# Patient Record
Sex: FEMALE | Race: WHITE
[De-identification: names, ages, dates, MRNs, and addresses within clinical notes are randomized per-mention and may not be internally consistent; named-entity substitution may affect disease eponyms.]

---

## 2018-08-16 ENCOUNTER — HOSPITAL ENCOUNTER (INPATIENT)
Dept: HOSPITAL 68 - ERH | Age: 63
LOS: 6 days | DRG: 175 | End: 2018-08-22
Attending: INTERNAL MEDICINE | Admitting: INTERNAL MEDICINE
Payer: COMMERCIAL

## 2018-08-16 VITALS — WEIGHT: 274 LBS | BODY MASS INDEX: 43 KG/M2 | HEIGHT: 67 IN

## 2018-08-16 DIAGNOSIS — D68.59: ICD-10-CM

## 2018-08-16 DIAGNOSIS — M54.9: ICD-10-CM

## 2018-08-16 DIAGNOSIS — Z91.81: ICD-10-CM

## 2018-08-16 DIAGNOSIS — I82.4Z2: ICD-10-CM

## 2018-08-16 DIAGNOSIS — Z90.01: ICD-10-CM

## 2018-08-16 DIAGNOSIS — I87.2: ICD-10-CM

## 2018-08-16 DIAGNOSIS — I47.1: ICD-10-CM

## 2018-08-16 DIAGNOSIS — J96.01: ICD-10-CM

## 2018-08-16 DIAGNOSIS — Z90.49: ICD-10-CM

## 2018-08-16 DIAGNOSIS — I44.4: ICD-10-CM

## 2018-08-16 DIAGNOSIS — Z79.51: ICD-10-CM

## 2018-08-16 DIAGNOSIS — E78.5: ICD-10-CM

## 2018-08-16 DIAGNOSIS — F41.9: ICD-10-CM

## 2018-08-16 DIAGNOSIS — I45.10: ICD-10-CM

## 2018-08-16 DIAGNOSIS — I26.99: Primary | ICD-10-CM

## 2018-08-16 DIAGNOSIS — E11.42: ICD-10-CM

## 2018-08-16 DIAGNOSIS — Z79.01: ICD-10-CM

## 2018-08-16 DIAGNOSIS — F32.9: ICD-10-CM

## 2018-08-16 DIAGNOSIS — D68.62: ICD-10-CM

## 2018-08-16 DIAGNOSIS — E66.9: ICD-10-CM

## 2018-08-16 DIAGNOSIS — Z79.84: ICD-10-CM

## 2018-08-16 DIAGNOSIS — R32: ICD-10-CM

## 2018-08-16 DIAGNOSIS — Z86.73: ICD-10-CM

## 2018-08-16 LAB
ABSOLUTE GRANULOCYTE CT: 6.2 /CUMM (ref 1.4–6.5)
APTT BLD: 31 SEC (ref 25–37)
BASOPHILS # BLD: 0 /CUMM (ref 0–0.2)
BASOPHILS NFR BLD: 0.1 % (ref 0–2)
EOSINOPHIL # BLD: 0.1 /CUMM (ref 0–0.7)
EOSINOPHIL NFR BLD: 1.3 % (ref 0–5)
ERYTHROCYTE [DISTWIDTH] IN BLOOD BY AUTOMATED COUNT: 15.2 % (ref 11.5–14.5)
GRANULOCYTES NFR BLD: 80 % (ref 42.2–75.2)
HCT VFR BLD CALC: 35.6 % (ref 37–47)
LYMPHOCYTES # BLD: 0.9 /CUMM (ref 1.2–3.4)
MCH RBC QN AUTO: 26 PG (ref 27–31)
MCHC RBC AUTO-ENTMCNC: 33.2 G/DL (ref 33–37)
MCV RBC AUTO: 78.3 FL (ref 81–99)
MONOCYTES # BLD: 0.5 /CUMM (ref 0.1–0.6)
PLATELET # BLD: 150 /CUMM (ref 130–400)
PMV BLD AUTO: 10 FL (ref 7.4–10.4)
PROTHROMBIN TIME: 13.9 SEC (ref 9.4–12.5)
RED BLOOD CELL CT: 4.54 /CUMM (ref 4.2–5.4)
WBC # BLD AUTO: 7.8 /CUMM (ref 4.8–10.8)

## 2018-08-16 PROCEDURE — 85613 RUSSELL VIPER VENOM DILUTED: CPT

## 2018-08-16 PROCEDURE — 85730 THROMBOPLASTIN TIME PARTIAL: CPT
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
## 2018-08-16 NOTE — CT SCAN REPORT
EXAMINATION:
CT ANGIOGRAM OF THE CHEST WITH AND WITHOUT CONTRAST (CT PULMONARY ANGIOGRAM
FOR PE)
 
CLINICAL INFORMATION:
Shortness of breath. Hypoxia.
 
COMPARISON:
Portable chest 08/16/2018. CT of chest 04/11/2009.
 
TECHNIQUE:
Prior to contrast administration, noncontrast localization images were
obtained. Subsequently, multidetector volumetric imaging was performed from
the thoracic inlet to below the diaphragms following the administration of 95
mL Optiray 320 intravenous contrast.
No contrast reaction reported.
Sagittal, coronal, and MIP oblique sagittal reformatted images were obtained
on the CT workstation, uploaded to PACS, and reviewed.
Total exam dose-length product 552.73 mGy-cm.
 
FINDINGS:
 
QUALITY OF STUDY/CONTRAST BOLUS: Satisfactory
 
PULMONARY ARTERIES: There are pulmonary emboli. There are multiple emboli in
the distal right main pulmonary artery that extend into the secondary and
tertiary branches of the upper lobe, lower lobe and middle lobe. No emboli
seen in the left lung.
 
THORACIC AORTA: No aneurysm or dissection.
 
LUNG: There is focal dense consolidation with air bronchograms involving the
right lower lobe. This is a large infiltrate involving the majority of the
right lower lobe. There is a small extension now into the inferior right
middle lobe.
 
PLEURA: No pleural effusion or pneumothorax.
 
MEDIASTINUM: There is a enlarged lymph node at the srinivas in the pretracheal
retrovascular space and smaller lymph nodes in the same space and the AP
window. There is an enlarged right paratracheal lymph node. These are likely
reactive.
 
No evidence of septal bowing or right heart strain.
 
CHEST WALL/AXILLA: No axillary or internal mammary lymphadenopathy.
 
OSSEOUS STRUCTURES: Multilevel degenerative spondylosis spine with endplate
spurring of the vertebrae.
 
UPPER ABDOMEN: Unremarkable. No reflux of contrast into the hepatic veins to
suggest elevated right heart pressures.
Status post cholecystectomy.
 
IMPRESSION:
1. There are multiple emboli in the right lung.
 
2. Lumbar pneumonia of the right lower lobe. There is mediastinal
lymphadenopathy which is likely reactive.
 
This critical result was discussed with Dr. Mack on 08/16/2018, 11:00 PM
and it was ascertained that the content and urgency of the report was
understood at the time of direct communication.
 
VTE: positive

## 2018-08-16 NOTE — CT SCAN REPORT
EXAMINATION:
CT HEAD WITHOUT CONTRAST
 
CLINICAL INFORMATION:
Question TIA.
 
COMPARISON:
Brain MRI 11/29/2017.
 
TECHNIQUE:
Contiguous axial imaging was performed from the skull base to vertex without
intravenous administration of contrast.
 
DLP:
635 mGy-cm.
 
FINDINGS:
There is no intracranial hemorrhage, large infarction, or mass lesion. There
is no extra-axial collection. There is redemonstration of old left caudate
lacunar infarct. There is mild scattered hypoattenuation in the bilateral
cerebral white matter, which is nonspecific but likely reflects small vessel
disease. There is no evidence of hydrocephalus.
 
The visualized paranasal sinuses and mastoid air cells are clear. A right
ocular prosthesis is noted. There is asymmetric prominence of the right
superior ophthalmic vein but similar to prior.
 
IMPRESSION:
- No acute intracranial abnormality.
- Chronic infarct in the left caudate.
- Mild small vessel ischemic changes in the cerebral white matter.

## 2018-08-16 NOTE — RADIOLOGY REPORT
EXAMINATION:
CHEST 1 VIEW
 
CLINICAL INFORMATION:
Shortness of breath. Hypoxia.
 
COMPARISON:
08/29/2014.
 
TECHNIQUE:
An AP view of the chest is provided.
 
FINDINGS:
The cardiac silhouette is not enlarged. The mediastinal and hilar contours
are unremarkable. There are neither pleural effusions nor pneumothoraces.
There is right lower lobe opacification. The osseous structures are
unremarkable.
 
IMPRESSION:
 
Right lower lobe opacification concerning for pneumonia.
 
Recommendation is for a followup chest series to be obtained following
treatment and/or resolution of symptoms to assure resolution of this
appearance.
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
## 2018-08-17 VITALS — SYSTOLIC BLOOD PRESSURE: 102 MMHG | DIASTOLIC BLOOD PRESSURE: 60 MMHG

## 2018-08-17 VITALS — DIASTOLIC BLOOD PRESSURE: 60 MMHG | SYSTOLIC BLOOD PRESSURE: 116 MMHG

## 2018-08-17 LAB
ABSOLUTE GRANULOCYTE CT: 3.2 /CUMM (ref 1.4–6.5)
APTT BLD: 57 SEC (ref 25–37)
APTT BLD: 62 SEC (ref 25–37)
BASOPHILS # BLD: 0.1 /CUMM (ref 0–0.2)
BASOPHILS NFR BLD: 0.9 % (ref 0–2)
EOSINOPHIL # BLD: 0.2 /CUMM (ref 0–0.7)
EOSINOPHIL NFR BLD: 4.4 % (ref 0–5)
ERYTHROCYTE [DISTWIDTH] IN BLOOD BY AUTOMATED COUNT: 15.4 % (ref 11.5–14.5)
GRANULOCYTES NFR BLD: 56 % (ref 42.2–75.2)
HCT VFR BLD CALC: 33 % (ref 37–47)
LYMPHOCYTES # BLD: 1.7 /CUMM (ref 1.2–3.4)
MCH RBC QN AUTO: 26.1 PG (ref 27–31)
MCHC RBC AUTO-ENTMCNC: 33 G/DL (ref 33–37)
MCV RBC AUTO: 79.1 FL (ref 81–99)
MONOCYTES # BLD: 0.5 /CUMM (ref 0.1–0.6)
PLATELET # BLD: 140 /CUMM (ref 130–400)
PMV BLD AUTO: 10 FL (ref 7.4–10.4)
RED BLOOD CELL CT: 4.16 /CUMM (ref 4.2–5.4)
WBC # BLD AUTO: 5.7 /CUMM (ref 4.8–10.8)

## 2018-08-17 NOTE — ECHOCARDIOGRAM REPORT
TONY SOTO 
 
 Age:    63     :    1955      Gender:     F 
 
 MRN:    [id number] 
 
 Exam Date:     2018  
                08:00 
 
 Exam Location:  
 [facility] 
 
 Ht (in):     68      Wt (lb):      260     BSA:    2.43 
 
 BP:          136     /     80 
 
 Ordering Physician:        Awadalla, Amira, MD 
 
 Referring Physician:       Awadalla, Amira, MD 
 
 Technologist:              Ok Purcell [facility] 
 
 Room Number:               107-1 
 
 Indications:       Other pulmonary embolism without acute cor pulmonale 
 
 Rhythm:                 Sinus 
 
 Technical Quality:      poor 
 
 
 FINDINGS 
 Left Ventricle 
 Normal left ventricular size with mild left ventricular hypertrophy.   
 Normal systolic function with no obvious regional wall motion  
 abnormalities.    The ejection fraction is visually estimated at  
 60%. 
 
 Right Ventricle 
 The right ventricle is not measured. Mildly decreased contractility 
 
 Right Atrium 
 The right atrium is normal in size. 
 
 Left Atrium 
 The left atrium is normal in size.  The interatrial septum is  
 intact. 
 
 Mitral Valve 
 The mitral valve is normal in structure and function.  There is no  
 mitral regurgitation. 
 
 Aortic Valve 
 Mildly thickened and sclerotic aortic valve with mild stenosis.   
 There is no aortic regurgitation. 
 
 Tricuspid Valve 
 The tricuspid valve is normal in structure and function.  There is  
 trace tricuspid regurgitation.  Pulmonary artery systolic pressure  
 is normal based on suboptimal measurements. 
 
 Pulmonic Valve 
 Structurally normal pulmonic valve.  There is no pulmonic  
 regurgitation. 
 
 Pericardium 
 Normal pericardium without effusion.  No pleural effusion. 
 
 Great Vessels 
 Normal aortic root dimension.  The aortic arch and great vessels are  
 well seen and are normal. 
 
 CONCLUSIONS 
 1. Normal EF of 60%. 
 2. Mild left ventricular hypertrophy. 
 3. Mildly decreaed RV function. 
 4. Trace tricuspid regurgitation. 
 5. Normal RV pressures based on suboptimal measurements. 
 
 Mustapha Orta M.D. 
 (Electronically Signed) 
 Final Date:      2018  
                  21:37 
 
 MEASUREMENTS  (Male / Female) Normal Values 
 
 2D ECHO 
 LV Diastolic Diameter PLAX                          5.0 cm           4.2 - 5.9 
/ 3.9 - 5.3 cm 
 LV Systolic Diameter PLAX                           3.6 cm           2.1 - 4.0 
cm 
 LV Fractional Shortening PLAX                       28.0 %           25 - 46  %
 
 LV Ejection Fraction 2D Teich                       54.0 %            
 IVS Diastolic Thickness                             1.5 cm            
 LVPW Diastolic Thickness                            1.3 cm            
 LV Relative Wall Thickness                          0.6               
 LVOT Diameter                                       2.1 cm            
 Aortic Root Diameter                                2.9 cm            
 LA Systolic Diameter LX                             3.9 cm           3.0 - 4.0 
/ 2.7 - 3.8 cm 
 LV Diastolic Length 4C                              7.8 cm           6.9 - 10.3
cm 
 LV Diastolic Area 4C                                23.6 cm          
 LV Diastolic Volume MOD 4C                          61.0 cm          
 LV Ejection Fraction MOD 4C                         67.2 %            
 LV Stroke Volume MOD 4C                             41.0 cm          
 LV Cardiac Index MOD 4C                             1416.1 cm/min   
 LV Systolic Length 4C                               6.0 cm            
 LV Systolic Area 4C                                 12.1 cm          
 LV Systolic Volume MOD 4C                           20.0 cm          
 LV Ejection Fraction MOD 2C                         70.5 %            
 LV Cardiac Index MOD 2C                             2141.4 cm/min   
 LV Diastolic Volume 4C AL                           60.5 cm         85 - 139 /
69 - 109 cm 
 LV Systolic Volume 4C AL                            20.8 cm          
 LV Ejection Fraction 4C AL                          65.6 %            
 LV Stroke Volume 4C AL                              39.7 cm          
 LV Cardiac Index 4C AL                              1370.7 cm/min   
 LV Ejection Fraction 2C AL                          71.6 %            
 LV Cardiac Index 2C AL                              2209.0 cm/min   
 Ascending Aorta Diameter                            3.7 cm            
 
 DOPPLER 
 AV Peak Velocity                                    229.0 cm/s        
 AV Peak Gradient                                    21.0 mmHg         
 LVOT Peak Velocity                                  111.0 cm/s        
 LVOT Peak Gradient                                  4.9 mmHg          
 AV Area Cont Eq pk                                  1.7 cm           
 Mitral E Point Velocity                             86.9 cm/s         
 Mitral A Point Velocity                             71.6 cm/s         
 Mitral E to A Ratio                                 1.2               
 MV Deceleration Time                                299.0 ms          
 TR Peak Velocity                                    255.0 cm/s        
 TR Peak Gradient                                    26.0 mmHg         
 PV Peak Velocity                                    126.0 cm/s        
 PV Peak Gradient                                    6.4 mmHg          
 LV E' Lateral Velocity                              10.4 cm/s         
 Mitral E to LV E' Lateral Ratio                     8.4               
 LV E' Septal Velocity                               8.1 cm/s          
 Mitral E to LV E' Septal Ratio                      10.7
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
## 2018-08-17 NOTE — ULTRASOUND REPORT
EXAMINATION:
BILATERAL LOWER EXTREMITY VENOUS ULTRASOUND
 
CLINICAL INFORMATION:
Rule out DVT in setting of PE.
 
COMPARISON:
CT pulmonary angiogram dated 8/16/2018. Left lower extremity venous Doppler
ultrasound dated 9/29/2017 and 12/9/2015. Right lower extremity venous
Doppler ultrasound dated 1/14/2016.
 
TECHNIQUE:
Doppler spectral analysis and color flow Doppler imaging was performed of the
lower extremities. Compression and augmentation maneuvers were performed.
 
FINDINGS:
RIGHT LOWER EXTREMITY VENOUS DOPPLER ULTRASOUND:
The right common femoral vein, greater saphenous vein takeoff, femoral vein,
and popliteal vein are normally compressible with normal augmentation
responses and phasic changes seen with Doppler imaging. The midcalf peroneal
and posterior tibial veins are patent as well.
 
No popliteal cyst is seen.
 
LEFT LOWER EXTREMITY VENOUS DOPPLER ULTRASOUND:
The right common femoral vein, greater saphenous vein takeoff, femoral vein
and proximal popliteal vein are normally compressible with normal phasic
changes and augmentation responses seen. In the mid and distal popliteal
vein, abnormal eccentric echogenic thrombus is seen with noncompressibility
of the popliteal vein and no flow seen in the thrombosed segment extending
into the popliteal trunk. Findings are consistent with a partial nonocclusive
thrombus. (Of note, on a 12/9/2015 ultrasound, more extensive left popliteal
vein thrombus was seen extending into the lower femoral vein. This was found
to have resolved on an intervening ultrasound dated 11/29/2017.) There is
also noncompressibility and lack of vascular flow seen within the left
gastrocnemius veins. The left peroneal and posterior tibial veins are patent.
 
No popliteal cyst is seen.
 
IMPRESSION:
1. Recurrent nonocclusive thrombus is seen within the left popliteal vein
extending into the popliteal trunk. This is less extensive than on prior exam
from 12/9/2015 and new when compared to 11/29/2017.
2. Occlusive thrombus is seen in the left gastrocnemius veins.
3. No evidence of deep venous thrombosis in the right lower extremity.
 
This critical result was discussed with Dr. Fransisca Mcdaniels 8/17/2018, 12:29 PM
and it was ascertained that the content and urgency of this report was
understood at the time of direct communication.
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
## 2018-08-18 VITALS — DIASTOLIC BLOOD PRESSURE: 60 MMHG | SYSTOLIC BLOOD PRESSURE: 110 MMHG

## 2018-08-18 VITALS — DIASTOLIC BLOOD PRESSURE: 60 MMHG | SYSTOLIC BLOOD PRESSURE: 108 MMHG

## 2018-08-18 VITALS — SYSTOLIC BLOOD PRESSURE: 124 MMHG | DIASTOLIC BLOOD PRESSURE: 70 MMHG

## 2018-08-18 VITALS — SYSTOLIC BLOOD PRESSURE: 130 MMHG | DIASTOLIC BLOOD PRESSURE: 74 MMHG

## 2018-08-18 LAB
ABSOLUTE GRANULOCYTE CT: 2.3 /CUMM (ref 1.4–6.5)
APTT BLD: 71 SEC (ref 25–37)
BASOPHILS # BLD: 0 /CUMM (ref 0–0.2)
BASOPHILS NFR BLD: 0.6 % (ref 0–2)
EOSINOPHIL # BLD: 0.4 /CUMM (ref 0–0.7)
EOSINOPHIL NFR BLD: 7.3 % (ref 0–5)
ERYTHROCYTE [DISTWIDTH] IN BLOOD BY AUTOMATED COUNT: 15.1 % (ref 11.5–14.5)
GRANULOCYTES NFR BLD: 47.4 % (ref 42.2–75.2)
HCT VFR BLD CALC: 31.8 % (ref 37–47)
LYMPHOCYTES # BLD: 1.8 /CUMM (ref 1.2–3.4)
MCH RBC QN AUTO: 26.1 PG (ref 27–31)
MCHC RBC AUTO-ENTMCNC: 33.1 G/DL (ref 33–37)
MCV RBC AUTO: 78.9 FL (ref 81–99)
MONOCYTES # BLD: 0.4 /CUMM (ref 0.1–0.6)
PLATELET # BLD: 144 /CUMM (ref 130–400)
PMV BLD AUTO: 10.6 FL (ref 7.4–10.4)
RED BLOOD CELL CT: 4.03 /CUMM (ref 4.2–5.4)
WBC # BLD AUTO: 4.9 /CUMM (ref 4.8–10.8)
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
## 2018-08-19 VITALS — DIASTOLIC BLOOD PRESSURE: 98 MMHG | SYSTOLIC BLOOD PRESSURE: 148 MMHG

## 2018-08-19 VITALS — DIASTOLIC BLOOD PRESSURE: 72 MMHG | SYSTOLIC BLOOD PRESSURE: 128 MMHG

## 2018-08-19 VITALS — SYSTOLIC BLOOD PRESSURE: 132 MMHG | DIASTOLIC BLOOD PRESSURE: 80 MMHG

## 2018-08-19 LAB
ABSOLUTE GRANULOCYTE CT: 3 /CUMM (ref 1.4–6.5)
BASOPHILS # BLD: 0 /CUMM (ref 0–0.2)
BASOPHILS NFR BLD: 0.8 % (ref 0–2)
EOSINOPHIL # BLD: 0.3 /CUMM (ref 0–0.7)
EOSINOPHIL NFR BLD: 4.8 % (ref 0–5)
ERYTHROCYTE [DISTWIDTH] IN BLOOD BY AUTOMATED COUNT: 15.3 % (ref 11.5–14.5)
GRANULOCYTES NFR BLD: 57 % (ref 42.2–75.2)
HCT VFR BLD CALC: 34.1 % (ref 37–47)
LYMPHOCYTES # BLD: 1.6 /CUMM (ref 1.2–3.4)
MCH RBC QN AUTO: 26.2 PG (ref 27–31)
MCHC RBC AUTO-ENTMCNC: 33.3 G/DL (ref 33–37)
MCV RBC AUTO: 78.6 FL (ref 81–99)
MONOCYTES # BLD: 0.3 /CUMM (ref 0.1–0.6)
PLATELET # BLD: 182 /CUMM (ref 130–400)
PMV BLD AUTO: 10.4 FL (ref 7.4–10.4)
RED BLOOD CELL CT: 4.33 /CUMM (ref 4.2–5.4)
WBC # BLD AUTO: 5.3 /CUMM (ref 4.8–10.8)
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
## 2018-08-20 VITALS — SYSTOLIC BLOOD PRESSURE: 142 MMHG | DIASTOLIC BLOOD PRESSURE: 96 MMHG

## 2018-08-20 VITALS — SYSTOLIC BLOOD PRESSURE: 154 MMHG | DIASTOLIC BLOOD PRESSURE: 70 MMHG

## 2018-08-20 VITALS — SYSTOLIC BLOOD PRESSURE: 164 MMHG | DIASTOLIC BLOOD PRESSURE: 88 MMHG

## 2018-08-20 LAB
ABSOLUTE GRANULOCYTE CT: 4.8 /CUMM (ref 1.4–6.5)
BASOPHILS # BLD: 0 /CUMM (ref 0–0.2)
BASOPHILS NFR BLD: 0.4 % (ref 0–2)
EOSINOPHIL # BLD: 0.2 /CUMM (ref 0–0.7)
EOSINOPHIL NFR BLD: 2.7 % (ref 0–5)
ERYTHROCYTE [DISTWIDTH] IN BLOOD BY AUTOMATED COUNT: 15.2 % (ref 11.5–14.5)
GRANULOCYTES NFR BLD: 68.1 % (ref 42.2–75.2)
HCT VFR BLD CALC: 35.7 % (ref 37–47)
LYMPHOCYTES # BLD: 1.7 /CUMM (ref 1.2–3.4)
MCH RBC QN AUTO: 25.7 PG (ref 27–31)
MCHC RBC AUTO-ENTMCNC: 32.8 G/DL (ref 33–37)
MCV RBC AUTO: 78.4 FL (ref 81–99)
MONOCYTES # BLD: 0.3 /CUMM (ref 0.1–0.6)
PLATELET # BLD: 226 /CUMM (ref 130–400)
PMV BLD AUTO: 10.4 FL (ref 7.4–10.4)
RED BLOOD CELL CT: 4.55 /CUMM (ref 4.2–5.4)
WBC # BLD AUTO: 7.1 /CUMM (ref 4.8–10.8)
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
## 2018-08-20 NOTE — PATIENT DISCHARGE INSTRUCTIONS
Discharge Instructions
 
General Discharge Information
You were seen/treated for:
Multiple Pulmonary Emboli
LLE DVT
Watch for these problems:
Please return to the ER in case of chest pain, worsening shortness of breath, 
worsening leg pain or swelling ot any syncopal episodes. 
Special Instructions:
Please follow up with your PCP within a week after discharge. 
 
Please follow up with the hematologist within a week after discharge. 
 
Diet
Continue normal diet: Yes
 
Activity
Full Activity/No Limits: Yes
 
Acute Coronary Syndrome
 
Inclusion Criteria
At DC or during hospital stay patient has or had the following:
ACS DIAGNOSIS No
 
Discharge Core Measures
Meds if any: Prescribed or Continued at Discharge
Meds if any: NOT Prescribed or Continued at Discharge
 
Congestive Heart Failure
 
Inclusion Criteria
At DC or during hospital stay patient has or had the following:
CHF DIAGNOSIS No
 
Discharge Core Measures
Meds if any: Prescribed or Continued at Discharge
Meds if any: NOT Prescribed or Continued at Discharge
 
Cerebrovascular accident
 
Inclusion Criteria
At DC or during hospital stay patient has or had the following:
CVA/TIA Diagnosis No
 
Discharge Core Measures
Meds if any: Prescribed or Continued at Discharge
Meds if any: NOT Prescribed or Continued at Discharge
 
Venous thromboembolism
 
Inclusion Criteria
VTE Diagnosis Yes
VTE Type Pulmonary Embolism
VTE Confirmed by (Test) CT CHEST ANGIOGRAM
 
Discharge Core Measures
- Per Current guidelines, there needs to be overlap
- treatment for the first 5 days of Warfarin therapy.
- If discharged on Warfarin prior to 5 days of
- overlap therapy, the patient will need to be
- assessed for post discharge needs including
- *Post discharge parental anticoagulation
- *Warfarin and/or parental anticoagulation education
- *Follow up date to check INR post discharge
At least 5 days overlap therapy as Inpatient No
Meds if any: Prescribed or Continued at Discharge
Note: Overlap Therapy is Warfarin and Anticoagulant
Meds if any: NOT Prescribed or Continued at Discharge
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
## 2018-08-20 NOTE — RADIOLOGY REPORT
EXAMINATION:
XR CERVICAL SPINE
 
CLINICAL INFORMATION:
Cervical pain
 
COMPARISON:
None
 
TECHNIQUE:
4 views of cervical spine
 
FINDINGS:
The exam is limited as overlying soft tissue density limits evaluation of the
spine below the level of C5-C6 disc space.
 
Additionally the tip of the odontoid is not included in the field-of-view of
the exam on the odontoid projection.
Additionally there are radiopaque densities projecting overlying portions of
the spine on the lateral projection.
These radiopaque densities appear to be outside of the patient
 
There is spondylosis of the cervical spine with disc space narrowing endplate
osteophytes at the C4-C5 and C5-C6 levels. There is facet arthrosis at the
C3-C4 level.
 
IMPRESSION:
Limited examination as detailed above.
 
If concern for additional cervical abnormalities consider follow-up imaging
with CT

## 2018-08-20 NOTE — CT SCAN REPORT
EXAMINATION:
CT CERVICAL SPINE WITHOUT CONTRAST
 
CLINICAL INFORMATION:
Cervical spine radiographs 06/20/2018.
 
COMPARISON:
Cervical spine radiographs 08/20/2018.
 
TECHNIQUE:
 images were obtained. CT acquisition of the cervical spine was
performed without contrast. Data was reformatted into multiplanar images at
the acquisition workstation.
 
DLP:
350.42 mGy-cm
 
FINDINGS:
Alignment is normal. Vertebral body heights are preserved. There is no acute
fracture. No abnormal prevertebral soft tissue swelling. There is of
intervertebral disc height with associated sclerotic degenerative endplate
changes and disc osteophyte spurring at the levels of C4-C5, C5-C6, and
C6-C7. Mild degenerative arthrosis of the atlantodental joint. Grossly no
evidence of canal compromise. There is mild uncovertebral joint spurring at
multiple levels. No substantial bony neuroforaminal encroachment. There is a
somewhat exophytic 2.5 cm nodule arising from the lower pole of the right
thyroid lobe that projects into the upper mediastinum. Soft tissues of the
neck are otherwise grossly unremarkable. Visualized lung apices are clear.
 
IMPRESSION:
Multilevel degenerative spondylosis of the cervical spine is most advanced at
the levels of C4-C5, C5-C6, and C6-C7. No evidence of acute fracture and no
canal compromise. Of note there is a 2.5 cm exophytic nodule arising from the
lower pole of the right thyroid lobe that projects into the upper
mediastinum.

## 2018-08-21 VITALS — DIASTOLIC BLOOD PRESSURE: 92 MMHG | SYSTOLIC BLOOD PRESSURE: 148 MMHG

## 2018-08-21 VITALS — SYSTOLIC BLOOD PRESSURE: 158 MMHG | DIASTOLIC BLOOD PRESSURE: 98 MMHG

## 2018-08-21 VITALS — SYSTOLIC BLOOD PRESSURE: 160 MMHG | DIASTOLIC BLOOD PRESSURE: 100 MMHG

## 2018-08-21 LAB — PROTHROMBIN TIME: 13.1 SEC (ref 9.4–12.5)
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
## 2018-08-21 NOTE — CT SCAN REPORT
EXAMINATION:
CT ABDOMEN AND PELVIS WITH CONTRAST
 
CLINICAL INFORMATION:
Abdominal pain.
 
COMPARISON:
09/22/2017
 
TECHNIQUE:
Multidetector volumetric imaging was performed of the abdomen and pelvis
following IV administration of 95 mL of Optiray 320 intravenous contrast.
Sagittal and coronal reformatted images were obtained on the technologist's
workstation.
 
DLP:
1001 mGy-cm
 
FINDINGS:
 
LUNG BASES: Multifocal confluent airspace opacities in the right lung base.
This can be seen with pneumonia, sequela of aspiration. No effusion.
 
LIVER, GALLBLADDER, AND BILIARY TREE: The liver is normal in size, shape, and
attenuation. No focal hepatic lesion or biliary ductal dilatation is present.
Status post cholecystectomy. No intrahepatic biliary duct dilatation. CBD is
within normal limits.
 
PANCREAS: Stable fatty changes of the pancreas. No acute inflammatory changes
are seen.
 
SPLEEN: Unremarkable.
 
ADRENAL GLANDS: Unremarkable.
 
KIDNEYS AND URETERS: There is mild fullness of the right renal pelvis, with a
4 mm calculus at the ureteropelvic junction. This may reflect migration of
the previously noted lower pole calculus. Left extrarenal pelvis. No
suspicious renal lesions are seen. Minimal bilateral perinephric stranding.
Normal enhancement of bilateral kidneys.
 
BLADDER: Unremarkable.
 
GASTROINTESTINAL TRACT: There is gas and stool throughout the large colon. No
evidence of colonic wall thickening or pericolonic inflammatory changes. The
appendix appears unremarkable. Normal caliber of the small bowel loops. The
stomach is nondistended, precluding evaluation. No free fluid. No free air.
 
ABDOMINAL WALL: Small fat-containing umbilical hernia. Mild nonspecific
stranding in the subcutaneous tissues of the right mid abdomen.
 
LYMPH NODES: No pathologically enlarged lymph nodes are seen.
 
VASCULAR: Normal caliber aorta. Portal vein is enhancing.
 
PELVIC VISCERA: Anteverted uterus. Adnexa is within normal limits.
 
OSSEOUS STRUCTURES: Multilevel degenerative changes in the spine. No acute
osseous abnormality.
 
IMPRESSION:
1. There is a 4 mm calculus in the right ureteropelvic junction, with mild
fullness of the right renal pelvis.
 
2. Multifocal confluent airspace opacities in right lung base. This can be
seen with pneumonia, aspiration in the appropriate clinical circumstance.
 
3. Status post cholecystectomy.
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
## 2018-08-22 VITALS — DIASTOLIC BLOOD PRESSURE: 106 MMHG | SYSTOLIC BLOOD PRESSURE: 140 MMHG

## 2018-08-22 VITALS — DIASTOLIC BLOOD PRESSURE: 80 MMHG | SYSTOLIC BLOOD PRESSURE: 140 MMHG

## 2018-08-22 LAB
DRVVT SCREEN TO CONFIRM RATIO: 37 SEC
PROTHROMBIN TIME: 13.5 SEC (ref 9.4–12.5)
SCREEN APTT: (no result) S
SCREEN DRVVT: 43 SEC
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
## 2018-09-20 ENCOUNTER — HOSPITAL ENCOUNTER (OUTPATIENT)
Dept: HOSPITAL 68 - ERH | Age: 63
Setting detail: OBSERVATION
LOS: 1 days | End: 2018-09-21
Attending: EMERGENCY MEDICINE | Admitting: EMERGENCY MEDICINE
Payer: COMMERCIAL

## 2018-09-20 VITALS — WEIGHT: 252 LBS | HEIGHT: 67 IN | BODY MASS INDEX: 39.55 KG/M2

## 2018-09-20 DIAGNOSIS — R32: ICD-10-CM

## 2018-09-20 DIAGNOSIS — Z79.01: ICD-10-CM

## 2018-09-20 DIAGNOSIS — Z79.82: ICD-10-CM

## 2018-09-20 DIAGNOSIS — I10: ICD-10-CM

## 2018-09-20 DIAGNOSIS — Z86.718: ICD-10-CM

## 2018-09-20 DIAGNOSIS — G62.9: ICD-10-CM

## 2018-09-20 DIAGNOSIS — F41.9: ICD-10-CM

## 2018-09-20 DIAGNOSIS — R60.9: Primary | ICD-10-CM

## 2018-09-20 DIAGNOSIS — J44.9: ICD-10-CM

## 2018-09-20 DIAGNOSIS — F32.9: ICD-10-CM

## 2018-09-21 VITALS — DIASTOLIC BLOOD PRESSURE: 82 MMHG | SYSTOLIC BLOOD PRESSURE: 145 MMHG

## 2018-09-21 LAB
ABSOLUTE GRANULOCYTE CT: 5.9 /CUMM (ref 1.4–6.5)
APTT BLD: 42 SEC (ref 25–37)
BASOPHILS # BLD: 0.1 /CUMM (ref 0–0.2)
BASOPHILS NFR BLD: 1 % (ref 0–2)
EOSINOPHIL # BLD: 0.4 /CUMM (ref 0–0.7)
EOSINOPHIL NFR BLD: 3.8 % (ref 0–5)
ERYTHROCYTE [DISTWIDTH] IN BLOOD BY AUTOMATED COUNT: 15.7 % (ref 11.5–14.5)
GRANULOCYTES NFR BLD: 63.2 % (ref 42.2–75.2)
HCT VFR BLD CALC: 36.1 % (ref 37–47)
LYMPHOCYTES # BLD: 2.5 /CUMM (ref 1.2–3.4)
MCH RBC QN AUTO: 26 PG (ref 27–31)
MCHC RBC AUTO-ENTMCNC: 33.2 G/DL (ref 33–37)
MCV RBC AUTO: 78.4 FL (ref 81–99)
MONOCYTES # BLD: 0.5 /CUMM (ref 0.1–0.6)
PLATELET # BLD: 210 /CUMM (ref 130–400)
PMV BLD AUTO: 9.6 FL (ref 7.4–10.4)
PROTHROMBIN TIME: 33.1 SEC (ref 9.4–12.5)
RED BLOOD CELL CT: 4.61 /CUMM (ref 4.2–5.4)
WBC # BLD AUTO: 9.3 /CUMM (ref 4.8–10.8)
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
## 2018-10-22 NOTE — ULTRASOUND REPORT
EXAMINATION:
US TRIPLEX LOWER EXTREMITY, RIGHT
 
CLINICAL INFORMATION:
63-year-old female known to have left lower extremity deep venous thrombosis,
seen on recent lower extremity DVT study done on 08/17/2018. Patient has
developed right lower extremity swelling.
 
COMPARISON:
Bilateral lower extremity DVT study done on 08/17/2018.
 
TECHNIQUE:
Color-flow triplex imaging with spectral analysis and compression Doppler
were performed on the lower extremity.
 
FINDINGS:
Respiratory variation, normal compression and augmented flow are noted
throughout the lower extremity. The visualized common femoral vein,
superficial femoral vein, profunda femoral vein, and popliteal vein show no
evidence of deep venous thrombosis. The calf veins are not well-visualized
(patient has significant edema), suboptimally evaluated.
 
There is no Baker's cyst.
 
IMPRESSION:
No evidence of deep venous thrombosis is seen at right lower extremity from
the femoral through the popliteal veins. The calf veins are not
well-visualized, suboptimally evaluated. Home